# Patient Record
Sex: MALE | Race: WHITE
[De-identification: names, ages, dates, MRNs, and addresses within clinical notes are randomized per-mention and may not be internally consistent; named-entity substitution may affect disease eponyms.]

---

## 2017-04-15 ENCOUNTER — HOSPITAL ENCOUNTER (EMERGENCY)
Dept: HOSPITAL 74 - JER | Age: 37
Discharge: HOME | End: 2017-04-15
Payer: COMMERCIAL

## 2017-04-15 VITALS — DIASTOLIC BLOOD PRESSURE: 65 MMHG | SYSTOLIC BLOOD PRESSURE: 126 MMHG | HEART RATE: 75 BPM | TEMPERATURE: 97.5 F

## 2017-04-15 VITALS — BODY MASS INDEX: 36.1 KG/M2

## 2017-04-15 DIAGNOSIS — S05.02XA: Primary | ICD-10-CM
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
## 2018-10-19 ENCOUNTER — HOSPITAL ENCOUNTER (EMERGENCY)
Dept: HOSPITAL 74 - JER | Age: 38
Discharge: HOME | End: 2018-10-19
Payer: COMMERCIAL

## 2018-10-19 VITALS — HEART RATE: 82 BPM | DIASTOLIC BLOOD PRESSURE: 76 MMHG | SYSTOLIC BLOOD PRESSURE: 128 MMHG | TEMPERATURE: 98.6 F

## 2018-10-19 VITALS — BODY MASS INDEX: 25.1 KG/M2

## 2018-10-19 DIAGNOSIS — H10.33: Primary | ICD-10-CM

## 2018-10-19 NOTE — PDOC
History of Present Illness





- General


Chief Complaint: Eye Problem


Stated Complaint: EYE PROBLEM, FOREIGN BODY


Time Seen by Provider: 10/19/18 03:17





- History of Present Illness


Initial Comments: 





10/19/18 04:02


CHIEF COMPLAINT: eye pain





HISTORY OF PRESENT ILLNESS: 39 yo M with no PMH presents to ED with right sided 

eye pain since this evenin around 9 pm.  Patient reports that he was working in 

his attic "and moving a bunch of stuff around" when he noticed the irritation 

to his right eye.  He tried to go to sleep but woke up with discomfort and pain 

to the R eye, and then the L eye started feeling irritated as well. Patient 

denies wearing contacts. 





No recent travel or sick contacts. 





PAST MEDICAL HISTORY: Denies past medical history





FAMILY HISTORY: Denies





SOCIAL HISTORY:  Denies tobacco, alcohol, illicit drug use. 





SURGICAL HISTORY: Denies





ALLERGIES: No known drug allergies





REVIEW OF SYSTEMS


General/Constitutional: Denies fever or chills. Denies weakness, weight change.





HEENT: Denies change in vision. Denies ear pain or discharge. Denies sore 

throat.





Cardiovascular: Denies chest pain or shortness of breath.





Respiratory: Denies cough, wheezing, or hemoptysis.





Gastrointestinal: Denies nausea, vomiting, diarrhea or constipation. Denies 

rectal bleeding.





Genitourinary: Denies dysuria, frequency, or change in urination.





Musculoskeletal: Denies joint or muscle swelling or pain. Denies neck or back 

pain.





Skin and breasts: Denies rash or easy bruising.





Neurologic: Denies headache, vertigo, loss of consciousness, or loss of 

sensation.











PHYSICAL EXAM


General Appearance: Well-appearing, appropriately dressed.  No apparent distress

, no intoxication.





HEENT: No corneal abrasion appreciated on flourescein stain exam. EOMI, PERRLA, 

normal ENT inspection, normal voice, TMs normal, pharynx normal.  No 

conjunctival pallor.  No photophobia, scleral icterus.





Neck: Supple.  Trachea midline. No tenderness, rigidity, carotid bruit, stridor

, lymphadenopathy, or thyromegaly. 





Respiratory/Chest: Lungs CTAB.  No shortness of breath, chest tenderness, 

respiratory distress, accessory muscle use. No crackles, rales, rhonchi, stridor

, wheezing, dullness





Cardiovascular: RRR. S1, S2.  No JVD, murmur, bradycardia, tachycardia.





Vascular Pulses: Dorsalis-Pedis (R): 2+, Dorsalis-Pedis (L): 2+





Gastrointestinal/Abdominal: Normal bowel sounds.  Abdomen soft, non-distended.  

No tenderness or rebound tenderness. No  organomegaly, pulsatile mass, guarding

, hernia, hepatomegaly, splenomegaly.





Lymphatic: No adenopathy, tenderness.





Musculoskeletal/Extremities:  Normal inspection. FROM of all extremities, 

normal capillary refill.  Pelvis Stable.  No CVA tenderness. No tenderness to 

extremities, pedal edema, swelling, erythema or deformity.





Integumentary: Appropriate color, dry, warm.  No cyanosis, erythema, jaundice 

or rash





Neurologic: CNs II-XII intact. Fully oriented, alert.  Appropriate mood/affect. 

Motor strength 5/5.  No appreciable EOM palsy, facial droop or sensory deficit.


10/19/18 04:05








Past History





- Past Medical History


Allergies/Adverse Reactions: 


 Allergies











Allergy/AdvReac Type Severity Reaction Status Date / Time


 


No Known Allergies Allergy   Verified 10/19/18 04:00











Home Medications: 


Ambulatory Orders





Acetaminophen W/ Codeine #3 [Tylenol # 3 -] 1 tab PO Q6H #12 tablet MDD 4 04/15/

17 


Erythromycin 0.5% Eye Ointment [Erythromycin 0.5% Eye Ointment -] 1 applic OU 

TID #1 tube 04/15/17 


Polymyxin B Sulf/Trimethoprim [Polymyxin B-Tmp Eye Drops] 1 - 2 drop OU QID #1 

bottle 10/19/18 











- Suicide/Smoking/Psychosocial Hx


Smoking History: Never smoked


Have you smoked in the past 12 months: No


Information on smoking cessation initiated: No


Hx Alcohol Use: No


Drug/Substance Use Hx: No





*Physical Exam





- Vital Signs


 Last Vital Signs











Temp Pulse Resp BP Pulse Ox


 


 98.6 F   61   20   132/64   100 


 


 10/19/18 02:11  10/19/18 02:37  10/19/18 02:11  10/19/18 02:37  10/19/18 02:37














Medical Decision Making





- Medical Decision Making





10/19/18 04:05


39 yo M with no PMH presents to ED with right sided eye pain since this evenin 

around 9 pm.  





Likely allergic conjuntivitis. 





*DC/Admit/Observation/Transfer


Diagnosis at time of Disposition: 


Conjunctivitis


Qualifiers:


 Conjunctivitis type: acute Acute conjunctivitis type: unspecified Laterality: 

bilateral Qualified Code(s): H10.33 - Unspecified acute conjunctivitis, 

bilateral








- Discharge Dispostion


Disposition: HOME


Condition at time of disposition: Stable


Decision to Admit order: No





- Prescriptions


Prescriptions: 


Polymyxin B Sulf/Trimethoprim [Polymyxin B-Tmp Eye Drops] 1 - 2 drop OU QID #1 

bottle





- Referrals


Referrals: 


Laverne Moreira MD [Primary Care Provider] - 


Taz Gonsalves MD [Staff Physician] - 





- Patient Instructions


Printed Discharge Instructions:  DI for Conjunctivitis


Additional Instructions: 


Please use eyedrops as prescribed.  If symptoms persist after 5-7 days, please 

follow up with ophthalmology. If you develop any sharp, sudden pain to your eye

, or any change in vision, please return to the ER. 





- Post Discharge Activity

## 2019-05-12 NOTE — PDOC
*Physical Exam





- Vital Signs


 Last Vital Signs











Temp Pulse Resp BP Pulse Ox


 


 97.5 F L  75   20   126/65   100 


 


 04/15/17 05:58  04/15/17 05:58  04/15/17 05:58  04/15/17 05:58  04/15/17 05:58














ED Treatment Course





- Medications


Given in the ED: 


ED Medications














Discontinued Medications














Generic Name Dose Route Start Last Admin





  Trade Name Sindy  PRN Reason Stop Dose Admin


 


Acetaminophen/Codeine Phosphate  1 tab 04/15/17 06:15 04/15/17 06:31





  Tylenol # 3 -  PO 04/15/17 06:16  1 tab





  ONCE ONE   Administration


 


Erythromycin  1 applic 04/15/17 06:16 04/15/17 06:31





  Erythromycin 0.5% Eye Ointment  OU 04/15/17 06:17  1 applic





  ONCE ONE   Administration


 


Fluorescein Sodium  1 ea 04/15/17 06:41 04/15/17 06:42





  Fluorets -  OU 04/15/17 06:42  1 ea





  NOW ONE   Administration


 


Proparacaine HCl  2 drop 04/15/17 05:58 04/15/17 06:29





  Alcaine -  OS 04/15/17 05:59  Not Given





  ONCE ONE   


 


Tetracaine HCl  1 drop 04/15/17 06:30 04/15/17 06:32





  Tetravisc 0.5% Eye Drops -  OU 04/15/17 06:31  1 drop





  ONCE ONE   Administration














Medical Decision Making





- Medical Decision Making





04/15/17 08:51


[facility] called to say they did not receive rx for tylenol # 3. Rx resent





*DC/Admit/Observation/Transfer


Diagnosis at time of Disposition: 


Corneal abrasion


Qualifiers:


 Encounter type: initial encounter Laterality: left Qualified Code(s): S05.02XA 

- Injury of conjunctiva and corneal abrasion without foreign body, left eye, 

initial encounter





- Discharge Dispostion


Disposition: HOME


Condition at time of disposition: Stable





- Prescriptions


Prescriptions: 


Erythromycin 0.5% Eye Ointment [Erythromycin 0.5% Eye Ointment -] 1 applic OU 

TID #1 tube


Acetaminophen W/ Codeine #3 [Tylenol # 3 -] 1 tab PO Q6H #12 tablet MDD 4





- Referrals


Referrals: 


Kim Sanchez MD [Primary Care Provider] - 


Tyson Joyce [Staff Physician] - 





- Patient Instructions


Printed Discharge Instructions:  DI for Corneal Abrasion


Additional Instructions: 


Apply erythromycin ophthalmic ointment to both eyes 3 times a day.


Take Tylenol as needed for pain


Follow with the up ophthalmologist as listed on your discharge





Return back to the emergency department for severe/persistent or worsening 

symptoms.





You did not receive a tetanus rooster today because you had one within 5 years.





- Post Discharge Activity
History of Present Illness





- General


Chief Complaint: Eye Problem


Stated Complaint: EYE PROBLEM


Time Seen by Provider: 04/15/17 05:57


History Source: Patient





- History of Present Illness


Initial Comments: 





04/15/17 06:23


37-year-old male without any medical problems presents to the emergency 

department complaining of left more than right I discomfort. Patient states he 

was sleeping this evening and believes he was rubbing his eyes. He woke up 

complaining of eye discomfort denies any blurry vision or visual disturbance. 

Patient denies working with metal shavings or UV light. Last tetanus within 5 

years.





Past History





- Past Medical History


Allergies/Adverse Reactions: 


 Allergies











Allergy/AdvReac Type Severity Reaction Status Date / Time


 


No Known Allergies Allergy   Verified 04/15/17 05:58











Home Medications: 


Ambulatory Orders





Acetaminophen W/ Codeine #3 [Tylenol # 3 -] 1 tab PO Q6H #12 tablet MDD 4 04/15/

17 


Erythromycin 0.5% Eye Ointment [Erythromycin 0.5% Eye Ointment -] 1 applic OU 

TID #1 tube 04/15/17 











**Review of Systems





- Review of Systems


Able to Perform ROS?: Yes


Comments:: 





04/15/17 06:24


CONSTITUTIONAL: 


Absent: fever, chills, diaphoresis, generalized weakness, malaise, loss of 

appetite


HEENT: 


+Left>right eye discomfort; neg visual disturbance


Absent: rhinorrhea, nasal congestion, throat pain, throat swelling, difficulty 

swallowing, mouth swelling, ear pain, eye pain, visual Changes





Is the patient limited English proficient: No





*Physical Exam





- Physical Exam


Comments: 





04/15/17 06:26


GENERAL:


Well developed, well nourished. Awake and alert. No acute distress.


HEENT:


Normocephalic, atraumatic. PERRLA, EOMI. No conjunctival pallor. Sclera are non-

icteric. Moist mucous membranes. Oropharynx is clear.


V/A: right: 20/20 Left 20/20 b/l: 20/20





*DC/Admit/Observation/Transfer


Diagnosis at time of Disposition: 


Corneal abrasion


Qualifiers:


 Encounter type: initial encounter Laterality: left Qualified Code(s): S05.02XA 

- Injury of conjunctiva and corneal abrasion without foreign body, left eye, 

initial encounter





- Discharge Dispostion


Disposition: HOME


Condition at time of disposition: Stable





- Prescriptions


Prescriptions: 


Erythromycin 0.5% Eye Ointment [Erythromycin 0.5% Eye Ointment -] 1 applic OU 

TID #1 tube


Acetaminophen W/ Codeine #3 [Tylenol # 3 -] 1 tab PO Q6H #12 tablet MDD 4





- Referrals


Referrals: 


Kim Sanchez MD [Primary Care Provider] - 


Tyson Joyce [Staff Physician] - 





- Patient Instructions


Printed Discharge Instructions:  DI for Corneal Abrasion


Additional Instructions: 


Apply erythromycin ophthalmic ointment to both eyes 3 times a day.


Take Tylenol as needed for pain


Follow with the up ophthalmologist as listed on your discharge





Return back to the emergency department for severe/persistent or worsening 

symptoms.





You did not receive a tetanus rooster today because you had one within 5 years.
12-May-2019

## 2022-08-28 ENCOUNTER — HOSPITAL ENCOUNTER (EMERGENCY)
Dept: HOSPITAL 74 - JER | Age: 42
Discharge: HOME | End: 2022-08-28
Payer: SELF-PAY

## 2022-08-28 VITALS
RESPIRATION RATE: 20 BRPM | TEMPERATURE: 97.9 F | SYSTOLIC BLOOD PRESSURE: 127 MMHG | DIASTOLIC BLOOD PRESSURE: 81 MMHG | HEART RATE: 77 BPM

## 2022-08-28 VITALS — BODY MASS INDEX: 32.3 KG/M2

## 2022-08-28 DIAGNOSIS — M54.2: Primary | ICD-10-CM

## 2022-08-28 PROCEDURE — 3E0233Z INTRODUCTION OF ANTI-INFLAMMATORY INTO MUSCLE, PERCUTANEOUS APPROACH: ICD-10-PCS
